# Patient Record
Sex: FEMALE | Race: WHITE | NOT HISPANIC OR LATINO
[De-identification: names, ages, dates, MRNs, and addresses within clinical notes are randomized per-mention and may not be internally consistent; named-entity substitution may affect disease eponyms.]

---

## 2017-07-02 ENCOUNTER — TRANSCRIPTION ENCOUNTER (OUTPATIENT)
Age: 52
End: 2017-07-02

## 2020-09-01 ENCOUNTER — TRANSCRIPTION ENCOUNTER (OUTPATIENT)
Age: 55
End: 2020-09-01

## 2022-06-10 ENCOUNTER — EMERGENCY (EMERGENCY)
Facility: HOSPITAL | Age: 57
LOS: 1 days | Discharge: HOME | End: 2022-06-10
Attending: EMERGENCY MEDICINE
Payer: SELF-PAY

## 2022-06-10 VITALS
WEIGHT: 199.96 LBS | SYSTOLIC BLOOD PRESSURE: 129 MMHG | TEMPERATURE: 100 F | DIASTOLIC BLOOD PRESSURE: 90 MMHG | RESPIRATION RATE: 18 BRPM | OXYGEN SATURATION: 96 % | HEART RATE: 80 BPM

## 2022-06-10 PROCEDURE — 12002 RPR S/N/AX/GEN/TRNK2.6-7.5CM: CPT

## 2022-06-10 PROCEDURE — 99284 EMERGENCY DEPT VISIT MOD MDM: CPT

## 2022-06-10 PROCEDURE — 99283 EMERGENCY DEPT VISIT LOW MDM: CPT | Mod: 25

## 2022-06-10 RX ORDER — ACETAMINOPHEN 500 MG
2 TABLET ORAL
Qty: 112 | Refills: 0
Start: 2022-06-10 | End: 2022-06-23

## 2022-06-10 RX ORDER — IBUPROFEN 200 MG
1 TABLET ORAL
Qty: 42 | Refills: 0
Start: 2022-06-10 | End: 2022-06-16

## 2022-06-10 RX ORDER — CEPHALEXIN 500 MG
1 CAPSULE ORAL
Qty: 28 | Refills: 0
Start: 2022-06-10 | End: 2022-06-16

## 2022-06-10 NOTE — ED PROVIDER NOTE - CARE PROVIDER_API CALL
Deysi Siu (DO)  Orthopaedic Surgery  [address] [place] [address]  Phone: [phone]  Fax: [phone]  Follow Up Time: 7-10 Days

## 2022-06-10 NOTE — ED PROCEDURE NOTE - ATTENDING CONTRIBUTION TO CARE
I, the attending physician, was present with the PA/NP/resident for the key portions of the procedure.  Roseline Molina, DO

## 2022-06-10 NOTE — ED ADULT TRIAGE NOTE - CHIEF COMPLAINT QUOTE
Pt tripped and fell at work this AM, injured left knee.  Pt went to urgent care and was told to come to ED for eval of left knee.  Pt able to amb.  Pt has CD of xray in hand.

## 2022-06-10 NOTE — ED PROVIDER NOTE - OBJECTIVE STATEMENT
ARIELA CLARKE is a 55yo F  who ambulated in c/o left knee pain s/p mechanical fall at work 5 hours PTA. Pt states she fell from standing after tripping at work and landed on her knees on concrete. C/o b/l knee pain w/ left hurting more than right. States pain is non-radiating, non-remitting, improves w/ rest and worse with ambulation. She was able to get up from the fall and ambulated per usual but w/ moderate pain. She denies any other associated symptoms specifically denying  anesthesia, paresthesia, weakness.

## 2022-06-10 NOTE — ED PROVIDER NOTE - PATIENT PORTAL LINK FT
You can access the FollowMyHealth Patient Portal offered by [facility] by registering at the following website: [email or web address]. By joining [facility]’s FollowMyHealth portal, you will also be able to view your health information using other applications (apps) compatible with our system.

## 2022-06-10 NOTE — ED PROVIDER NOTE - NS ED ATTENDING STATEMENT MOD
This was a shared visit with the MAGI. I reviewed and verified the documentation and independently performed the documented:

## 2022-06-10 NOTE — ED PROVIDER NOTE - CLINICAL SUMMARY MEDICAL DECISION MAKING FREE TEXT BOX
ASSESSMENT:   STONE CLARKE is a 57yo F who presented with left knee pain and laceration. Pt evaluated at urgent care w/ negative x-ray. Sent in by urgent care MD for laceration repair. Physical exam w/ small lac over left anterior knee. No other concerning findings. PT had tetanus at urgent care.     PLAN: close laceration, provide wound care instructions, ortho f/u in 1 week. Plan for suture removal in 1 week. Tylenol, ibuprofen for pain. Prophylactic abx.

## 2022-06-10 NOTE — ED PROCEDURE NOTE - NS ED ATTENDING STATEMENT MOD
I have seen and examined this patient and fully participated in the care of this patient as the teaching attending.  The service was shared with the MAGI.  I reviewed and verified the documentation and independently performed the documented:

## 2022-06-10 NOTE — ED PROVIDER NOTE - NS ED ROS FT
Review of Systems  CONSTITUTIONAL: afebrile w/no diaphoresis or weight changes  SKIN: warm, dry w/ no rash or bleeding  EYES: no changes to vision  ENT: no changes in hearing, no sore throat  RESPIRATORY: no cough or SOB  CARDIAC: no chest pain & no palpitations  GI: no abd pain, nausea, vomiting, diarrhea, constipation, or blood in stool/asher blood  GENITO-URINARY: no discharge, dysuria or hematuria,   MUSCULOSKELETAL:  B/L KNEE PAIN, R > L   NEUROLOGIC: no weakness, headache, anesthesia or paresthesias  PSYCH: no anxiety, non suicidal, non homicidal, without hallucinations or depression

## 2022-06-10 NOTE — ED PROCEDURE NOTE - CPROC ED LACER REPAIR DETAIL1
The wound was explored to base in bloodless field./All foreign material was removed./No foreign body/Non-extensive debridement was performed.

## 2022-06-10 NOTE — ED PROVIDER NOTE - NSFOLLOWUPINSTRUCTIONS_ED_ALL_ED_FT
(1) Follow up with orthopedics in 1 week if you still have knee pain.   (2) Change dressing daily, wash with warm water and soap and reapply bandage and ace wrap.   (3)  the antibiotics that have been sent to your pharmacy.     Laceration    A laceration is a cut that goes through all of the layers of the skin and into the tissue that is right under the skin. Some lacerations heal on their own. Others need to be closed with skin adhesive strips, skin glue, stitches (sutures), or staples. Proper laceration care minimizes the risk of infection and helps the laceration to heal better.  If non-absorbable stitches or staples have been placed, they must be taken out within the time frame instructed by your healthcare provider.    SEEK IMMEDIATE MEDICAL CARE IF YOU HAVE ANY OF THE FOLLOWING SYMPTOMS: swelling around the wound, worsening pain, drainage from the wound, red streaking going away from your wound, inability to move finger or toe near the laceration, or discoloration of skin near the laceration.

## 2022-06-10 NOTE — ED PROVIDER NOTE - PHYSICAL EXAMINATION
VITAL SIGNS: I have reviewed vital signs  CONSTITUTIONAL:  in no acute distress.  SKIN: Warm, dry, no rash.  HEAD: Normocephalic, atraumatic.  EYES: PERRL, conjunctiva and sclera clear.  ENT: pink & moist mucosa, no pharyngeal erythema  NECK: Supple, non tender. No cervical lymphadenopathy.  CARD: Regular rate and rhythm. No murmurs.   RESP: No wheezes, rales or rhonchi.   ABD:  soft, non-distended, non-tender.   MSK:  b.l knee Inspection with no bony deformity, asymmetry, atrophy, erythema, or edema/effusion but LEFT KNEE w/ 2 cm crescent shaped laceration inferior to patella. Palpation of b/l knees w/ no warmth, appreciable fluid, or tenderness. Full active and passive ROM w/ mild pain. Good sensation and pulses in limb. Ambulation w/ coordinated gait.  NEURO: Alert, oriented. Grossly unremarkable. No focal deficits.   PSYCH: Cooperative, alert & oriented x3

## 2022-06-10 NOTE — ED PROVIDER NOTE - ATTENDING APP SHARED VISIT CONTRIBUTION OF CARE
55 yo F p/w mechanical fall onto b/l knees, had xrays at  and no fx, soft tissue swelling only (paper report and cd given to pt), sent for lac repair to left knee     vss  left knee vertical 3 cm lac    repaired  keflex rx  dc w pmd , ortho f.u suture removal 7 days  Based on the H&P, I do not suspect any life- / limb- threatening pathology that requires further intervention at this time.

## 2024-07-09 ENCOUNTER — APPOINTMENT (OUTPATIENT)
Dept: INTERNAL MEDICINE | Facility: CLINIC | Age: 59
End: 2024-07-09
Payer: COMMERCIAL

## 2024-07-09 VITALS
BODY MASS INDEX: 34.49 KG/M2 | HEART RATE: 98 BPM | SYSTOLIC BLOOD PRESSURE: 123 MMHG | HEIGHT: 64 IN | WEIGHT: 202 LBS | OXYGEN SATURATION: 99 % | DIASTOLIC BLOOD PRESSURE: 70 MMHG

## 2024-07-09 DIAGNOSIS — Z80.3 FAMILY HISTORY OF MALIGNANT NEOPLASM OF BREAST: ICD-10-CM

## 2024-07-09 DIAGNOSIS — I80.02 PHLEBITIS AND THROMBOPHLEBITIS OF SUPERFICIAL VESSELS OF LEFT LOWER EXTREMITY: ICD-10-CM

## 2024-07-09 PROCEDURE — 99213 OFFICE O/P EST LOW 20 MIN: CPT

## 2024-08-05 ENCOUNTER — APPOINTMENT (OUTPATIENT)
Dept: VASCULAR SURGERY | Facility: CLINIC | Age: 59
End: 2024-08-05

## 2024-08-05 PROBLEM — I87.2 VENOUS INSUFFICIENCY: Status: ACTIVE | Noted: 2024-08-05

## 2024-08-05 PROCEDURE — 93971 EXTREMITY STUDY: CPT | Mod: LT

## 2024-08-05 PROCEDURE — 99204 OFFICE O/P NEW MOD 45 MIN: CPT

## 2024-08-05 NOTE — PHYSICAL EXAM
[Respiratory Effort] : normal respiratory effort [Normal Rate and Rhythm] : normal rate and rhythm [Abdomen Tenderness] : ~T ~M No abdominal tenderness [No Rash or Lesion] : No rash or lesion [Alert] : alert [Oriented to Person] : oriented to person [Oriented to Place] : oriented to place [Oriented to Time] : oriented to time [Calm] : calm [de-identified] : no acute distress noted, [de-identified] : normocephalic [FreeTextEntry1] : palpable pedal pulses, left C4 venous disease, right C2

## 2024-08-05 NOTE — HISTORY OF PRESENT ILLNESS
[FreeTextEntry1] : 60 YO F with left leg pain and swelling presents for new patient evaluation. 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 Uses compression stockings. Denies DVT history. Has LLE GSV RFA at another facility 2 years ago. Pain and swelling has improved. She recently had a varicose vein near her ankle burst. She had initially felt pain and burning near that area but that is now improving. Denies DVT history. Non smoker but does have second hand exposure from . Denies claudication symptoms. No right leg symptoms

## 2024-08-05 NOTE — ASSESSMENT
[FreeTextEntry1] : 58 YO F with chronic venous insufficiency, left C4, right C2.  - venous reflux study demonstrates LLE GSV ablated in thigh with incompetent calf GSV with branches - Patient with minimal symptoms and no ulceration will continue compression therapy - return to clinic in 6 months

## 2024-08-15 ENCOUNTER — NON-APPOINTMENT (OUTPATIENT)
Age: 59
End: 2024-08-15

## 2024-08-15 ENCOUNTER — APPOINTMENT (OUTPATIENT)
Dept: INTERNAL MEDICINE | Facility: CLINIC | Age: 59
End: 2024-08-15
Payer: COMMERCIAL

## 2024-08-15 VITALS
WEIGHT: 204 LBS | OXYGEN SATURATION: 96 % | BODY MASS INDEX: 37.54 KG/M2 | DIASTOLIC BLOOD PRESSURE: 79 MMHG | SYSTOLIC BLOOD PRESSURE: 134 MMHG | HEIGHT: 62 IN | HEART RATE: 93 BPM

## 2024-08-15 DIAGNOSIS — Z12.83 ENCOUNTER FOR SCREENING FOR MALIGNANT NEOPLASM OF SKIN: ICD-10-CM

## 2024-08-15 DIAGNOSIS — Z12.39 ENCOUNTER FOR OTHER SCREENING FOR MALIGNANT NEOPLASM OF BREAST: ICD-10-CM

## 2024-08-15 DIAGNOSIS — Z82.49 FAMILY HISTORY OF ISCHEMIC HEART DISEASE AND OTHER DISEASES OF THE CIRCULATORY SYSTEM: ICD-10-CM

## 2024-08-15 DIAGNOSIS — Z13.820 ENCOUNTER FOR SCREENING FOR OSTEOPOROSIS: ICD-10-CM

## 2024-08-15 DIAGNOSIS — Z78.9 OTHER SPECIFIED HEALTH STATUS: ICD-10-CM

## 2024-08-15 DIAGNOSIS — Z00.00 ENCOUNTER FOR GENERAL ADULT MEDICAL EXAMINATION W/OUT ABNORMAL FINDINGS: ICD-10-CM

## 2024-08-15 PROCEDURE — G2211 COMPLEX E/M VISIT ADD ON: CPT | Mod: NC

## 2024-08-15 PROCEDURE — 93000 ELECTROCARDIOGRAM COMPLETE: CPT

## 2024-08-15 PROCEDURE — 99396 PREV VISIT EST AGE 40-64: CPT

## 2024-08-15 RX ORDER — MULTIVIT-MIN/FOLIC/VIT K/LYCOP 400-300MCG
TABLET ORAL
Refills: 0 | Status: ACTIVE | COMMUNITY
Start: 2024-08-15

## 2024-08-15 NOTE — HEALTH RISK ASSESSMENT
[Good] : ~his/her~  mood as  good [Yes] : Yes [2 - 4 times a month (2 pts)] : 2-4 times a month (2 points) [1 or 2 (0 pts)] : 1 or 2 (0 points) [Never (0 pts)] : Never (0 points) [No] : In the past 12 months have you used drugs other than those required for medical reasons? No [No falls in past year] : Patient reported no falls in the past year [0] : 2) Feeling down, depressed, or hopeless: Not at all (0) [PHQ-2 Negative - No further assessment needed] : PHQ-2 Negative - No further assessment needed [Never] : Never [NO] : No [No Retinopathy] : No retinopathy [Patient reported mammogram was normal] : Patient reported mammogram was normal [Patient reported PAP Smear was normal] : Patient reported PAP Smear was normal [Patient reported colonoscopy was normal] : Patient reported colonoscopy was normal [HIV test declined] : HIV test declined [Hepatitis C test declined] : Hepatitis C test declined [With Significant Other] : lives with significant other [Employed] : employed [] :  [Sexually Active] : sexually active [Feels Safe at Home] : Feels safe at home [Fully functional (bathing, dressing, toileting, transferring, walking, feeding)] : Fully functional (bathing, dressing, toileting, transferring, walking, feeding) [Fully functional (using the telephone, shopping, preparing meals, housekeeping, doing laundry, using] : Fully functional and needs no help or supervision to perform IADLs (using the telephone, shopping, preparing meals, housekeeping, doing laundry, using transportation, managing medications and managing finances) [Reports normal functional visual acuity (ie: able to read med bottle)] : Reports normal functional visual acuity [Smoke Detector] : smoke detector [Carbon Monoxide Detector] : carbon monoxide detector [Safety elements used in home] : safety elements used in home [Seat Belt] :  uses seat belt [Sunscreen] : uses sunscreen [Audit-CScore] : 1 [de-identified] : none [de-identified] : likes to eat junk food. Mostly fast food. Chinese food. Pizza. 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

## 2024-08-15 NOTE — PLAN
[FreeTextEntry1] : Chronic venous insufficiency: - Recommend continuing use of compression stockings to facilitate venous return - Can elevate legs to reduce swelling - Continue f/u with vascular as needed  Obesity: - BMI 37.31 - Likely due to poor diet and lack of exercise - Extensive discussion regarding importance of making better dietary choices to reduce complications of obesity including DM2 and CAD. - Recommend patient stop eating fast food. Advised patient to limit dietary sources of cholesterol/saturated fats including red meats, processed meats, cheese, butter, and fried foods. Diet should consist of lean meats such as grilled chicken and fish, vegetables, and only a small amount of complex carbohydrates. Patient should exercise regularly with a goal of 150 min/week of moderately vigorous exercise. Recommend patient start by walking 3-5 times per week. - Sweating at work is likely due to deconditioning  Elevated BP: - BP slightly above goal - Recommend reducing dietary intake of sodium. Discussed that fast food is very high in sodium and is likely contributing to her high BP  HCM: - Patient did not fast today. Script provided to patient to have fasting blood work done at outside lab.  - EKG NSR with RSR' (V1) - Mammo and DEXA script provided to patient - Advised patient to make appt with GYN for pap smear. GYN referral placed today. - Next colonoscopy- will try to obtain records to determine when patient is due for colonoscopy, as she is not sure. Last colonoscopy over 5 years ago, patient reports it was normal. Colonoscopy done at [facility] in [place]. - Next eye exam- 2024. Patient advised she is due for eye exam this year - Dermatology referral placed for skin cancer screening. - Discussed availability of shingles vaccine for herpes zoster. Discussed that it is not necessary to have had a confirmed case of varicella as a child to receive the shingles vaccine, as most adults born before 1980 have had varicella. Discussed risks associated with herpes zoster including postherpetic neuralgia, herpes ophthalmicus, and visceral dissemination of herpes zoster, which is a life-threatening emergency. Advised patient to get vaccinated at local pharmacy to avoid these complications.   f/u 6 months

## 2024-08-15 NOTE — PHYSICAL EXAM
[No Acute Distress] : no acute distress [Well Nourished] : well nourished [Well Developed] : well developed [Well-Appearing] : well-appearing [Normal Sclera/Conjunctiva] : normal sclera/conjunctiva [PERRL] : pupils equal round and reactive to light [EOMI] : extraocular movements intact [Normal Outer Ear/Nose] : the outer ears and nose were normal in appearance [Normal Oropharynx] : the oropharynx was normal [No JVD] : no jugular venous distention [No Lymphadenopathy] : no lymphadenopathy [Supple] : supple [Thyroid Normal, No Nodules] : the thyroid was normal and there were no nodules present [No Respiratory Distress] : no respiratory distress  [No Accessory Muscle Use] : no accessory muscle use [Clear to Auscultation] : lungs were clear to auscultation bilaterally [Normal Rate] : normal rate  [Regular Rhythm] : with a regular rhythm [Normal S1, S2] : normal S1 and S2 [No Murmur] : no murmur heard [No Carotid Bruits] : no carotid bruits [No Abdominal Bruit] : a ~M bruit was not heard ~T in the abdomen [No Varicosities] : no varicosities [Pedal Pulses Present] : the pedal pulses are present [No Edema] : there was no peripheral edema [No Palpable Aorta] : no palpable aorta [No Extremity Clubbing/Cyanosis] : no extremity clubbing/cyanosis [Soft] : abdomen soft [Non Tender] : non-tender [Non-distended] : non-distended [No Masses] : no abdominal mass palpated [No HSM] : no HSM [Normal Bowel Sounds] : normal bowel sounds [Normal Posterior Cervical Nodes] : no posterior cervical lymphadenopathy [Normal Anterior Cervical Nodes] : no anterior cervical lymphadenopathy [No CVA Tenderness] : no CVA  tenderness [No Spinal Tenderness] : no spinal tenderness [No Joint Swelling] : no joint swelling [Grossly Normal Strength/Tone] : grossly normal strength/tone [No Rash] : no rash [Coordination Grossly Intact] : coordination grossly intact [No Focal Deficits] : no focal deficits [Normal Gait] : normal gait [Deep Tendon Reflexes (DTR)] : deep tendon reflexes were 2+ and symmetric [Normal Affect] : the affect was normal [Normal Insight/Judgement] : insight and judgment were intact [de-identified] : bilateral mild non-pitting edema [de-identified] : hyperpigmentation of the left shin

## 2024-08-15 NOTE — HISTORY OF PRESENT ILLNESS
[FreeTextEntry1] : CPE [de-identified] : Patient is a 59-year-old female presenting for a CPE. Patient reports feeling well today. She followed with vascular for bilateral lower extremity edema. Diagnosed with chronic venous insufficiency and was recommended to use compression socks. Patient presents with bilateral compression socks on. She reports that she eats a lot of fast food ([employer], [employer] sandwiches, Chinese food). She does not exercise. 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   Last mammo- over 5 years ago Last DEXA- never Last pap- 2022 (normal) Last colonoscopy- over 5 years ago  Last eye exam- 2023 (floaters) Last skin cancer screening- never Shingles vaccine- never

## 2024-08-15 NOTE — REVIEW OF SYSTEMS
[Lower Ext Edema] : lower extremity edema [Negative] : Heme/Lymph [Chest Pain] : no chest pain [Palpitations] : no palpitations [Leg Claudication] : no leg claudication [Orthopnea] : no orthopnea [Paroxysmal Nocturnal Dyspnea] : no paroxysmal nocturnal dyspnea